# Patient Record
Sex: MALE | Race: WHITE | Employment: STUDENT | URBAN - METROPOLITAN AREA
[De-identification: names, ages, dates, MRNs, and addresses within clinical notes are randomized per-mention and may not be internally consistent; named-entity substitution may affect disease eponyms.]

---
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
## 2021-10-26 NOTE — MR AVS SNAPSHOT
[facility]  [address]  [phone]               Thank you for choosing us for your health care visit with Dina Preston MD.  We are glad to serve you and happy to provide you with this summary Apply to pimples QAM   Commonly known as:  BENZACLIN           * methylPREDNISolone 4 MG Tbpk   Take by oral route. Commonly known as:  MEDROL DOSEPACK           * methylPREDNISolone 4 MG Tbpk   Take by oral route.    Commonly known as:  [facility]

## 2023-11-24 ENCOUNTER — WALK IN (OUTPATIENT)
Dept: URGENT CARE | Age: 23
End: 2023-11-24
Attending: FAMILY MEDICINE

## 2023-11-24 VITALS
SYSTOLIC BLOOD PRESSURE: 161 MMHG | DIASTOLIC BLOOD PRESSURE: 89 MMHG | BODY MASS INDEX: 29.4 KG/M2 | RESPIRATION RATE: 18 BRPM | WEIGHT: 210 LBS | TEMPERATURE: 97.8 F | OXYGEN SATURATION: 98 % | HEIGHT: 71 IN | HEART RATE: 79 BPM

## 2023-11-24 DIAGNOSIS — Z11.3 SCREENING EXAMINATION FOR STD (SEXUALLY TRANSMITTED DISEASE): ICD-10-CM

## 2023-11-24 DIAGNOSIS — R30.0 BURNING WITH URINATION: Primary | ICD-10-CM

## 2023-11-24 LAB
APPEARANCE, POC: CLEAR
BILIRUBIN, POC: NEGATIVE
COLOR, POC: YELLOW
GLUCOSE UR-MCNC: NEGATIVE MG/DL
KETONES, POC: NEGATIVE MG/DL
NITRITE, POC: NEGATIVE
OCCULT BLOOD, POC: NEGATIVE
PH UR: 5.5 [PH] (ref 5–7)
PROT UR-MCNC: NEGATIVE MG/DL
SP GR UR: 1.02 (ref 1–1.03)
UROBILINOGEN UR-MCNC: 0.2 MG/DL (ref 0–1)
WBC (LEUKOCYTE) ESTERASE, POC: NEGATIVE

## 2023-11-24 PROCEDURE — 99202 OFFICE O/P NEW SF 15 MIN: CPT

## 2023-11-24 PROCEDURE — 81003 URINALYSIS AUTO W/O SCOPE: CPT | Performed by: FAMILY MEDICINE

## 2023-11-24 PROCEDURE — 87491 CHLMYD TRACH DNA AMP PROBE: CPT | Performed by: FAMILY MEDICINE

## 2023-11-24 PROCEDURE — 87086 URINE CULTURE/COLONY COUNT: CPT | Performed by: FAMILY MEDICINE

## 2023-11-25 LAB — BACTERIA UR CULT: NO GROWTH

## 2023-11-27 LAB
C TRACH RRNA URTH QL NAA+PROBE: NEGATIVE
Lab: NORMAL
N GONORRHOEA RRNA URTH QL NAA+PROBE: NEGATIVE

## 2024-01-09 ENCOUNTER — OFFICE VISIT (OUTPATIENT)
Dept: INTERNAL MEDICINE CLINIC | Facility: CLINIC | Age: 24
End: 2024-01-09

## 2024-01-09 VITALS
BODY MASS INDEX: 28.62 KG/M2 | HEIGHT: 71.6 IN | WEIGHT: 209 LBS | SYSTOLIC BLOOD PRESSURE: 136 MMHG | DIASTOLIC BLOOD PRESSURE: 85 MMHG

## 2024-01-09 DIAGNOSIS — Z29.9 ENCOUNTER FOR PREVENTIVE MEASURE: Primary | ICD-10-CM

## 2024-01-09 PROCEDURE — 3008F BODY MASS INDEX DOCD: CPT | Performed by: INTERNAL MEDICINE

## 2024-01-09 PROCEDURE — 3079F DIAST BP 80-89 MM HG: CPT | Performed by: INTERNAL MEDICINE

## 2024-01-09 PROCEDURE — 3075F SYST BP GE 130 - 139MM HG: CPT | Performed by: INTERNAL MEDICINE

## 2024-01-09 PROCEDURE — 99203 OFFICE O/P NEW LOW 30 MIN: CPT | Performed by: INTERNAL MEDICINE

## 2024-01-09 PROCEDURE — 90715 TDAP VACCINE 7 YRS/> IM: CPT | Performed by: INTERNAL MEDICINE

## 2024-01-09 PROCEDURE — 90471 IMMUNIZATION ADMIN: CPT | Performed by: INTERNAL MEDICINE

## 2024-01-09 RX ORDER — ATOVAQUONE AND PROGUANIL HYDROCHLORIDE 250; 100 MG/1; MG/1
1 TABLET, FILM COATED ORAL DAILY
Qty: 106 TABLET | Refills: 0 | Status: SHIPPED | OUTPATIENT
Start: 2024-01-09

## 2024-01-09 NOTE — PATIENT INSTRUCTIONS
Please take malarone daily beginning 2 days before your departure, continuing throughout the trip, and then for 7 days after returning home.  You received a Tdap vaccine today, good for 10 years.

## 2024-01-09 NOTE — PROGRESS NOTES
Gael MARTE Namrata You is a 23 year old male.   Chief Complaint   Patient presents with    Other     Will be traveling to [place], needs prescription      HPI:   Itz presents this morning to discuss malaria prophylaxis.    He is traveling to multiple countries in [place].  Trip duration is 97 days.  Malaria prophylaxis has been recommended.  He has an appointment later today at [facility] for typhoid, yellow fever and rabies prophylaxis.  He feels well.  No other issues for discussion.    No significant past medical history.  No previous surgeries or hospitalizations.  He takes no regular medications.  No medication allergies.    Last Tdap vaccine June 2013.  Recently received an influenza vaccine and the most recent COVID booster.  Current Outpatient Medications   Medication Sig Dispense Refill    Atovaquone-Proguanil HCl 250-100 MG Oral Tab Take 1 tablet by mouth daily. 106 tablet 0     No Known Allergies   History reviewed. No pertinent past medical history.  History reviewed. No pertinent surgical history.   Social History:  Social History     Socioeconomic History    Marital status: Single   Tobacco Use    Smoking status: Never   Substance and Sexual Activity    Drug use: Never        EXAM:   GENERAL: Pleasant male appearing well in no distress  /85 (BP Location: Left arm, Patient Position: Sitting, Cuff Size: large)   Ht 5' 11.6\" (1.819 m)   Wt 209 lb (94.8 kg)   BMI 28.66 kg/m²   Exam deferred    ASSESSMENT AND PLAN:   1.  Encounter for preventive measure  Malarone 1 pill daily beginning 2 days prior to departure, continuing during his trip, and for 7 days after return home.  Prescription sent to pharmacy.  Tdap vaccine today.    The patient indicates understanding of these issues and agrees to the plan.  The patient is asked to return as needed.    Gael Justin MD  1/9/2024  10:28 AM

## (undated) NOTE — MR AVS SNAPSHOT
[address], [address] [address] [address] [facility]  [phone]               Thank you for choosing us for your health care visit with Benjy Green. DO Adrienne.   We are glad to serve you and happy to provide you Tazarotene 0.1 % Crea   Apply to entire face nightly   Commonly known as:  TAZORAC           * Notice: This list has 2 medication(s) that are the same as other medications prescribed for you.  Read the directions carefully, and ask your doctor or other ca

## (undated) NOTE — MR AVS SNAPSHOT
[facility]  [address]  [phone]               Thank you for choosing us for your health care visit with Trent Goodman MD.  We are glad to serve you and happy to provide you with this summa Take 1 tablet (875 mg total) by mouth 2 (two) times daily.    Commonly known as:  AMOXIL           * Clindamycin Phos-Benzoyl Perox 1.2-2.5 % Gel   Apply to AA QAM as a spot treatment   Commonly known as:  ACANYA           * Clindamycin Phos-Benzoyl Perox 1 accept and enjoy it. It is also important to encourage play time as soon as they start crawling and walking. As your children grow, continue to help them live a healthy active lifestyle.     To lead a healthy active life, families can strive to reach these

## (undated) NOTE — Clinical Note
6/7/2017              [facility]         Immunization History   Administered Date(s) Administered   • DTAP 06/02/2003   • DTP 07/26/1999, 09/27/1999, 11/27/1999, 12/04/2000   • HEP B 05